# Patient Record
Sex: FEMALE | Race: WHITE | Employment: FULL TIME | URBAN - METROPOLITAN AREA
[De-identification: names, ages, dates, MRNs, and addresses within clinical notes are randomized per-mention and may not be internally consistent; named-entity substitution may affect disease eponyms.]

---

## 2019-05-07 NOTE — TELEPHONE ENCOUNTER
Phone Call:     Contact Name Patient   Outcome Patient said they will bring in test results of a family member. They also said that they have not been seen for this at any other facilities.

## 2019-07-12 ENCOUNTER — ONCOLOGY VISIT (OUTPATIENT)
Dept: ONCOLOGY | Facility: CLINIC | Age: 59
End: 2019-07-12

## 2019-07-12 ENCOUNTER — PRE VISIT (OUTPATIENT)
Dept: ONCOLOGY | Facility: CLINIC | Age: 59
End: 2019-07-12

## 2019-07-12 DIAGNOSIS — Z84.81 FAMILY HISTORY OF CARRIER OF GENETIC DISEASE: ICD-10-CM

## 2019-07-12 DIAGNOSIS — Z80.3 FAMILY HISTORY OF MALIGNANT NEOPLASM OF BREAST: Primary | ICD-10-CM

## 2019-07-12 PROCEDURE — 96040 ZZC GENETIC COUNSELING, EACH 30 MIN: CPT | Performed by: GENETIC COUNSELOR, MS

## 2019-07-12 PROCEDURE — 40001076 ZZHCL STATISTICAL OVANEXT: Mod: 90 | Performed by: INTERNAL MEDICINE

## 2019-07-12 PROCEDURE — 36415 COLL VENOUS BLD VENIPUNCTURE: CPT | Performed by: INTERNAL MEDICINE

## 2019-07-12 PROCEDURE — 99000 SPECIMEN HANDLING OFFICE-LAB: CPT | Performed by: INTERNAL MEDICINE

## 2019-07-12 NOTE — PROGRESS NOTES
7/16/2019    Referring Provider: Self-referred    Presenting Information:   I met with Hortencia Cintron today for genetic counseling at the Cancer Risk Management Program at the [facility] to discuss the known BRCA2 mutation in her family. Specifically, the familial BRCA2 mutation is c.2330dupA. This mutation was initially found in a paternal cousin. This cousin's sister also tested positive for this mutation. Hortencia provided us with this sister's targeted test results. She is here today to review this history, cancer screening recommendations, and available genetic testing options.    Personal History:  Hortencia is a 59 year old female.  She does not have any personal history of cancer. She had her first menstrual period at age 11, her first child at age 33, and is completed menopause at age 53. Hortencia has her ovaries, fallopian tubes and uterus in place, and she has had no ovarian cancer screening to date.  She reports that she has not used hormone replacement therapy. She has annual clinical breast exams and mammograms.  Hortencia has not had a colonoscopy. She does not regularly do any other cancer screening at this time.     Family History: (Please see scanned pedigree for detailed family history information)  A paternal cousin was diagnosed with breast cancer in her late 50's. She reportedly had genetic testing when she was diagnosed approximately 10 years ago and tested positive for a mutation in BRCA2. A copy of this test report was not available for review today.  This cousin's daughter was diagnosed with breast cancer in her early 40's. She reportedly tested positive for the familial BRCA2 mutation. A copy of this test report were not available for review today.    A paternal cousin, the sister of the first cousin, has not had cancer but tested positive for the familial BRCA2 mutation. A copy of her targeted test result was provided at today's visit which showed the mutation to be BRCA2 c.2330dupA.     Her  maternal ethnicity is [place]. Her paternal ethnicity is [place] and English.  There is no known Ashkenazi Synagogue ancestry on either side of her family. There is no reported consanguinity.    Discussion:    We discussed the natural history and genetics of BRCA2 related cancers. A detailed handout regarding breast and ovarian cancer, along with the information we discussed, was provided to Hortencia at the end of our appointment today and can be found in the after visit summary.  Topics included: inheritance pattern, cancer risks, cancer screening recommendations, and also risks, benefits and limitations of testing.    Based on Hortencia's cousin's genetic test result, Hortencia has up to a 12.5% chance of also carrying the same genetic change in the BRCA2 gene. No other paternal relatives had cancer or had genetic testing. There is a 50% chance that Hortencia's uncle, her cousins' father, carried the BRCA2 mutation. If he carried this mutation, there is a 50% chance Hortencia's father carried this mutation. If he carried the mutation, there is a 50% chance that Hortencia carries this mutation. Both her father and uncle have passed away and are not available for testing.     Based on this, Hortencia meets the National Comprehensive Cancer Network (NCCN) criteria for genetic testing of the familial BRCA2 mutation.      Genetic testing is available for: BRCA2 site-specific testing for the known familial mutation c.2330dupA or panel testing. We reviewed genetic testing options for hereditary breast and gynecologic cancers: actionable high/moderate breast and gynecologic cancer risk custom panel (CustomNext-Cancer, 19 genes, a combination of GynPlus and BRCAplus + NBN, STK11, and NF1) and expanded high and moderate risk panel (OvaNext, 25 genes). Hortencia expressed an interest in learning as much information as possible from the testing. She opted for the OvaNext panel.  Genetic testing is available for 25 genes associated with hereditary gynecologic,  breast, and related cancers: OvaNext (BELEN, BARD1, BRCA1, BRCA2, BRIP1, CDH1, CHEK2, DICER1, EPCAM, MLH1, MRE11A, MSH2, MSH6, MUTYH, NBN, NF1, PALB2, PMS2, PTEN, RAD50, RAD51C, RAD51D, SMARCA4, STK11, and TP53).  We discussed that some of the genes in the OvaNext panel are associated with specific hereditary cancer syndromes and published management guidelines: Hereditary Breast and Ovarian Cancer syndrome (BRCA1, BRCA2), Cheney syndrome (MLH1, MSH2, MSH6, PMS2, EPCAM), Hereditary Diffuse Gastric Cancer (CDH1), Cowden syndrome (PTEN), Li Fraumeni syndrome (TP53), Peutz-Jeghers syndrome (STK11), MUTYH Associated Polyposis (MUTYH), and Neurofibromatosis type 1 (NF1).    Risk-reducing salpingo-oophorectomy can be considered in women with mutations in BRIP1, RAD51C, or RAD51D. Breast and/or other cancer risk management guidelines are available for BELEN, CHEK2, PALB2, NF1, and NBN.  The remaining genes (BARD1, DICER1, MRE11A, RAD50, and SMARCA4) are associated with increased cancer risk and may allow us to make medical recommendations when mutations are identified.    Hortencia was provided with a detailed brochure from [employer] explaining the OvaNext testing.  Consent was obtained and genetic testing for OvaNext was sent to [facility]. Turn around time: approximately 5 weeks.    We discussed the implications of both positive and negative test result for Hortencia.     If testing comes back positive for the familial BRCA2 mutation, Hortencia will be recommended to follow the screening recommendations for women with BRCA2 mutations as published by the National Comprehensive Cancer Network (NCCN). Women with BRCA2 mutations qualify for high risk screening, which involves breast MRI in addition to mammogram. Double mastectomy may be considered based on family history. Risk-reducing salpingo-oophorectomy is recommended upon completion of child bearing. Further screening recommendations will be discussed if needed after  test results become available.    If testing comes back negative for this mutation, Hortencia would not be at an increased risk for BRCA2-associated cancers. We would discuss other cancer screening as necessary.    Medical Management: For Hortencia, we reviewed that the information from genetic testing may determine:    additional cancer screening for which Hortencia may qualify (i.e. mammogram and breast MRI, more frequent colonoscopies, more frequent dermatologic exams, etc.),    options for risk reducing surgeries Hortencia could consider (i.e. bilateral mastectomy, surgery to remove the ovaries and/or uterus, etc.),      and targeted chemotherapies for Hortencia if she were to develop certain cancers in the future (i.e. Immunotherapy for individuals with Cheney syndrome, PARP inhibitors, etc.).      These recommendations will be discussed in detail once genetic testing is completed.    Hortencia also elected to pursue [facility]'s self pay option of $399 and not go through her insurance for genetic testing. She was concerned about insurance coverage and other implications should she test positive.    We informed her about the Genetic Information Nondiscrimination Act (ORALIA) and how this law protects individuals from being discriminated against by health insurance and employment, based on their genetic testing results. We also discussed that, for example, if she were to test positive, her insurance would be billed for any screening or surgery decisions based on those results, as a BRCA2 positive diagnosis code would be used in this case. We also discussed that there is no way to guarantee that her health insurance would not learn that she had genetic testing. Despite this, she still elected to do the self pay option for testing and the genetic counseling appointment.     Plan:  1) Today Hortencia elected to proceed with OvaNext.  2) This information should be available in 4-5 weeks.  3) Hortencia will return to clinic to discuss the results    Face  to face time: 40 minutes    Wilbert Sanchez MS  Genetic Counseling Intern  Phone: [phone]  Fax: [phone]    Attestation: Patient seen, evaluated and discussed with the Genetic Counseling Intern. I have verified the content of the note, which accurately reflects my assessment of the patient and the plan of care. She was made aware that we cannot guarantee that her health insurance will not learn she had genetic testing. Reviewed ORALIA information and insurance concerns again with patient at end of visit. Wilbert Sanchez was present. Patient elected to proceed with testing. She also stated that she will be paying for the visit herself; she was informed that diagnosis codes for the visit will also be in her records.   Supervising Genetic Counselor  Dia Levy MS, [facility]  Licensed Genetic Counselor

## 2019-07-12 NOTE — LETTER
Cancer Risk Management  Program Locations    [facility]  [facility]  [facility]  [facility]  Mailing Address  Cancer Risk Management Program  [facility]  [address]  [address]  [place] [address]    New patient appointments  [phone]  July 16, 2019    Hortencia Cintron  [address] [place] [facility] [address]    Dear Hortencia,    It was a pleasure meeting with you at [facility] on 7/12/19.  Here is a copy of the progress note from your recent genetic counseling visit to the Cancer Risk Management Program.  If you have any additional questions, please feel free to call.    Referring Provider: Self-referred    Presenting Information:   I met with Hortencia Cintron today for genetic counseling at the Cancer Risk Management Program at the [facility] to discuss the known BRCA2 mutation in her family. Specifically, the familial BRCA2 mutation is c.2330dupA. This mutation was initially found in a paternal cousin. This cousin's sister also tested positive for this mutation. Hortencia provided us with this sister's targeted test results. She is here today to review this history, cancer screening recommendations, and available genetic testing options.    Personal History:  Hortencia is a 59 year old female.  She does not have any personal history of cancer. She had her first menstrual period at age 11, her first child at age 33, and is completed menopause at age 53. Hortencia has her ovaries, fallopian tubes and uterus in place, and she has had no ovarian cancer screening to date.  She reports that she has not used hormone replacement therapy. She has annual clinical breast exams and mammograms.  Hortencia has not had a colonoscopy. She does not regularly do any other cancer screening at this time.     Family History: (Please see scanned pedigree  for detailed family history information)  A paternal cousin was diagnosed with breast cancer in her late 50's. She reportedly had genetic testing when she was diagnosed approximately 10 years ago and tested positive for a mutation in BRCA2. A copy of this test report was not available for review today.  This cousin's daughter was diagnosed with breast cancer in her early 40's. She reportedly tested positive for the familial BRCA2 mutation. A copy of this test report were not available for review today.    A paternal cousin, the sister of the first cousin, has not had cancer but tested positive for the familial BRCA2 mutation. A copy of her targeted test result was provided at today's visit which showed the mutation to be BRCA2 c.2330dupA.     Her maternal ethnicity is [place] citizen. Her paternal ethnicity is [place] and English.  There is no known Ashkenazi Synagogue ancestry on either side of her family. There is no reported consanguinity.    Discussion:    We discussed the natural history and genetics of BRCA2 related cancers. A detailed handout regarding breast and ovarian cancer, along with the information we discussed, was provided to Hortencia at the end of our appointment today and can be found in the after visit summary.  Topics included: inheritance pattern, cancer risks, cancer screening recommendations, and also risks, benefits and limitations of testing.    Based on Hortencia's cousin's genetic test result, Hortencia has up to a 12.5% chance of also carrying the same genetic change in the BRCA2 gene. No other paternal relatives had cancer or had genetic testing. There is a 50% chance that Hortencia's uncle, her cousins' father, carried the BRCA2 mutation. If he carried this mutation, there is a 50% chance Hortencia's father carried this mutation. If he carried the mutation, there is a 50% chance that Hortencia carries this mutation. Both her father and uncle have passed away and are not available for testing.     Based on this, Hortencia  meets the National Comprehensive Cancer Network (NCCN) criteria for genetic testing of the familial BRCA2 mutation.      Genetic testing is available for: BRCA2 site-specific testing for the known familial mutation c. 2330dupA or panel testing. We reviewed genetic testing options for hereditary breast and gynecologic cancers: actionable high/moderate breast and gynecologic cancer risk custom panel (CustomNext-Cancer, 19 genes, a combination of GynPlus and BRCAplus + NBN, STK11, and NF1) and expanded high and moderate risk panel (OvaNext, 25 genes). Hortencia expressed an interest in learning as much information as possible from the testing. She opted for the OvaNext panel.  Genetic testing is available for 25 genes associated with hereditary gynecologic, breast, and related cancers: OvaNext (BELEN, BARD1, BRCA1, BRCA2, BRIP1, CDH1, CHEK2, DICER1, EPCAM, MLH1, MRE11A, MSH2, MSH6, MUTYH, NBN, NF1, PALB2, PMS2, PTEN, RAD50, RAD51C, RAD51D, SMARCA4, STK11, and TP53).  We discussed that some of the genes in the OvaNext panel are associated with specific hereditary cancer syndromes and published management guidelines: Hereditary Breast and Ovarian Cancer syndrome (BRCA1, BRCA2), Cheney syndrome (MLH1, MSH2, MSH6, PMS2, EPCAM), Hereditary Diffuse Gastric Cancer (CDH1), Cowden syndrome (PTEN), Li Fraumeni syndrome (TP53), Peutz-Jeghers syndrome (STK11), MUTYH Associated Polyposis (MUTYH), and Neurofibromatosis type 1 (NF1).    Risk-reducing salpingo-oophorectomy can be considered in women with mutations in BRIP1, RAD51C, or RAD51D. Breast and/or other cancer risk management guidelines are available for BELEN, CHEK2, PALB2, NF1, and NBN.  The remaining genes (BARD1, DICER1, MRE11A, RAD50, and SMARCA4) are associated with increased cancer risk and may allow us to make medical recommendations when mutations are identified.    Hortencia was provided with a detailed brochure from [employer] explaining the OvaNext testing.  Consent was  obtained and genetic testing for OvaNext was sent to [facility]. Turn around time: approximately 5 weeks.    We discussed the implications of both positive and negative test result for Hortencia.     If testing comes back positive for the familial BRCA2 mutation, Hortencia will be recommended to follow the screening recommendations for women with BRCA2 mutations as published by the National Comprehensive Cancer Network (NCCN). Women with BRCA2 mutations qualify for high risk screening, which involves breast MRI in addition to mammogram. Double mastectomy may be considered based on family history. Risk-reducing salpingo-oophorectomy is recommended upon completion of child bearing. Further screening recommendations will be discussed if needed after test results become available.    If testing comes back negative for this mutation, Hortencia would not be at an increased risk for BRCA2-associated cancers. We would discuss other cancer screening as necessary.    Medical Management: For Hortencia, we reviewed that the information from genetic testing may determine:    additional cancer screening for which Hortencia may qualify (i.e. mammogram and breast MRI, more frequent colonoscopies, more frequent dermatologic exams, etc.),    options for risk reducing surgeries Hortencia could consider (i.e. bilateral mastectomy, surgery to remove the ovaries and/or uterus, etc.),      and targeted chemotherapies for Hortencia if she were to develop certain cancers in the future (i.e. Immunotherapy for individuals with Cheney syndrome, PARP inhibitors, etc.).      These recommendations will be discussed in detail once genetic testing is completed.    Hortencia also elected to pursue [facility]'s self pay option of $399 and not go through her insurance for genetic testing. She was concerned about insurance coverage and other implications should she test positive.    We informed her about the Genetic Information Nondiscrimination Act (ORALIA) and how this law  protects individuals from being discriminated against by health insurance and employment, based on their genetic testing results. We also discussed that, for example, if she were to test positive, her insurance would be billed for any screening or surgery decisions based on those results, as a BRCA2 positive diagnosis code would be used in this case. We also discussed that there is no way to guarantee that her health insurance would not learn that she had genetic testing. Despite this, she still elected to do the self pay option for testing and the genetic counseling appointment.     Plan:  1) Today Hortencia elected to proceed with OvaNext.  2) This information should be available in 4-5 weeks.  3) Hortencia will return to clinic to discuss the results    Face to face time: 40 minutes    Wilbert Sanchez MS  Genetic Counseling Intern  Phone: [phone]  Fax: [phone]    Attestation: Patient seen, evaluated and discussed with the Genetic Counseling Intern. I have verified the content of the note, which accurately reflects my assessment of the patient and the plan of care. She was made aware that we cannot guarantee that her health insurance will not learn she had genetic testing. Reviewed ORALIA information and insurance concerns again with patient at end of visit. Wilbert Sanchez was present. Patient elected to proceed with testing. She also stated that she will be paying for the visit herself; she was informed that diagnosis codes for the visit will also be in her records.   Supervising Genetic Counselor  Dia Levy MS, [facility]  Licensed Genetic Counselor

## 2019-07-12 NOTE — PATIENT INSTRUCTIONS
Assessing Cancer Risk  Only about 5-10% of cancers are thought to be due to an inherited cancer susceptibility gene.    These families often have:    Several people with the same or related types of cancer    Cancers diagnosed at a young age (before age 50)    Individuals with more than one primary cancer    Multiple generations of the family affected with cancer    Some people may be candidates for genetic testing of more than one gene.  For these families, genetic testing using a cancer panel may be offered.  These panels will test different genes known to increase the risk for breast, ovarian, uterine, and/or other cancers. All of the genes discussed below have published clinical management guidelines for individuals who are found to carry a mutation. The purpose of this handout is to serve as a brief summary of the genes analyzed by the panels used to inquire about hereditary breast and gynecologic cancer:  BELEN, BRCA1, BRCA2, BRIP1, CDH1, CHEK2, MLH1, MSH2, MSH6, PMS2, EPCAM, PTEN, PALB2, RAD51C, RAD51D, and TP53.  ______________________________________________________________________________  Hereditary Breast and Ovarian Cancer Syndrome   (BRCA1 and BRCA2)  A single mutation in one of the copies of BRCA1 or BRCA2 increases the risk for breast and ovarian cancer, among others.  The risk for pancreatic cancer and melanoma may also be slightly increased in some families.  The chart below shows the chance that someone with a BRCA mutation would develop cancer in his or her lifetime1,2,3,4.        A person s ethnic background is also important to consider, as individuals of Ashkenazi Sabianism ancestry have a higher chance of having a BRCA gene mutation.  There are three BRCA mutations that occur more frequently in this population.    Cheney Syndrome   (MLH1, MSH2, MSH6, PMS2, and EPCAM)  Currently five genes are known to cause Cheney Syndrome: MLH1, MSH2, MSH6, PMS2, and EPCAM.  A single mutation in one of the  Cheney Syndrome genes increases the risk for colon, endometrial, ovarian, and stomach cancers.  Other cancers that occur less commonly in Cheney Syndrome include urinary tract, skin, and brain cancers.  The chart below shows the chance that a person with Cheney syndrome would develop cancer in his or her lifetime5.      *Cancer risk varies depending on Cheney syndrome gene found    Cowden Syndrome   (PTEN)  Cowden syndrome is a hereditary condition that increases the risk for breast, thyroid, endometrial, colon, and kidney cancer.  Cowden syndrome is caused by a mutation in the PTEN gene.  A single mutation in one of the copies of PTEN causes Cowden syndrome and increases cancer risk.  The chart below shows the chance that someone with a PTEN mutation would develop cancer in their lifetime6,7.  Other benign features seen in some individuals with Cowden syndrome include benign skin lesions (facial papules, keratoses, lipomas), learning disability, autism, thyroid nodules, colon polyps, and larger head size.      *One recent study found breast cancer risk to be increased to 85%    Li-Fraumeni Syndrome   (TP53)  Li-Fraumeni Syndrome (LFS) is a cancer predisposition syndrome caused by a mutation in the TP53 gene. A single mutation in one of the copies of TP53 increases the risk for multiple cancers. Individuals with LFS are at an increased risk for developing cancer at a young age. The lifetime risk for development of a LFS-associated cancer is 50% by age 30 and 90% by age 60.   Core Cancers: Sarcomas, Breast, Brain, Lung, Leukemias/Lymphomas, Adrenocortical carcinomas  Other Cancers: Gastrointestinal, Thyroid, Skin, Genitourinary    Hereditary Diffuse Gastric Cancer   (CDH1)  Currently, one gene is known to cause hereditary diffuse gastric cancer (HDGC): CDH1.  Individuals with HDGC are at increased risk for diffuse gastric cancer and lobular breast cancer. Of people diagnosed with HDGC, 30-50% have a mutation in the CDH1  gene.  This suggests there are likely other genes that may cause HDGC that have not been identified yet.      Lifetime Cancer Risks    General Population HDGC    Diffuse Gastric  <1% ~80%   Breast 12% 39-52%         Additional Genes  BELEN  BELEN is a moderate-risk breast cancer gene. Women who have a mutation in BELEN can have between a 2-4 fold increased risk for breast cancer compared to the general population8. BELEN mutations have also been associated with increased risk for pancreatic cancer, however an estimate of this cancer risk is not well understood9. Individuals who inherit two BELEN mutations have a condition called ataxia-telangiectasia (AT).  This rare autosomal recessive condition affects the nervous system and immune system, and is associated with progressive cerebellar ataxia beginning in childhood.  Individuals with ataxia-telangiectasia often have a weakened immune system and have an increased risk for childhood cancers.    PALB2  Mutations in PALB2 have been shown to increase the risk of breast cancer up to 33-58% in some families; where individuals fall within this risk range is dependent upon family dsesbid96. PALB2 mutations have also been associated with increased risk for pancreatic cancer, although this risk has not been quantified yet.  Individuals who inherit two PALB2 mutations--one from their mother and one from their father--have a condition called Fanconi Anemia.  This rare autosomal recessive condition is associated with short stature, developmental delay, bone marrow failure, and increased risk for childhood cancers.    CHEK2   CHEK2 is a moderate-risk breast cancer gene.  Women who have a mutation in CHEK2 have around a 2-fold increased risk for breast cancer compared to the general population, and this risk may be higher depending upon family history.11,12,13 Mutations in CHEK2 have also been shown to increase the risk of a number of other cancers, including colon and prostate, however  these cancer risks are currently not well understood.    BRIP1, RAD51C and RAD51D  Mutations in BRIP1, RAD51C, and RAD51D have been shown to increase the risk of ovarian cancer and possibly female breast cancer as well14,15 .       Lifetime Cancer Risk    General Population BRIP1 RAD51C RAD51D   Ovarian 1-2% ~5-8% ~5-9% ~7-15%           Inheritance  All of the cancer syndromes reviewed above are inherited in an autosomal dominant pattern.  This means that if a parent has a mutation, each of his or her children will have a 50% chance of inheriting that same mutation.  Therefore, each child--male or female--would have a 50% chance of being at increased risk for developing cancer.      Image obtained from Genetics Home Reference, 2013     Mutations in some genes can occur de ray, which means that a person s mutation occurred for the first time in them and was not inherited from a parent.  Now that they have the mutation, however, it can be passed on to future generations.    Genetic Testing  Genetic testing involves a blood test and will look at the genetic information in the BELEN, BRCA1, BRCA2, BRIP1, CDH1, CHEK2, MLH1, MSH2, MSH6, PMS2, EPCAM, PTEN, PALB2, RAD51C, RAD51D, and TP53 genes for any harmful mutations that are associated with increased cancer risk.  If possible, it is recommended that the person(s) who has had cancer be tested before other family members.  That person will give us the most useful information about whether or not a specific gene is associated with the cancer in the family.    Results  There are three possible results of genetic testing:    Positive--a harmful mutation was identified in one or more of the genes    Negative--no mutation was identified in any of the genes on this panel    Variant of unknown significance--a variation in one of the genes was identified, but it is unclear how this impacts cancer risk in the family    Advantages and Disadvantages   There are advantages and  disadvantages to genetic testing.    Advantages    May clarify your cancer risk    Can help you make medical decisions    May explain the cancers in your family    May give useful information to your family members (if you share your results)    Disadvantages    Possible negative emotional impact of learning about inherited cancer risk    Uncertainty in interpreting a negative test result in some situations    Possible genetic discrimination concerns (see below)    Genetic Information Nondiscrimination Act (ORALIA)  ORALIA is a federal law that protects individuals from health insurance or employment discrimination based on a genetic test result alone.  Although rare, there are currently no legal discrimination protections in terms of life insurance, long term care, or disability insurances.  Visit the [employer] [place] website to learn more.    Reducing Cancer Risk  All of the genes described above have nationally recognized cancer screening guidelines that would be recommended for individuals who test positive.  In addition to increased cancer screening, surgeries may be offered or recommended to reduce cancer risk.  Recommendations are based upon an individual s genetic test result as well as their personal and family history of cancer.    Questions to Think About Regarding Genetic Testing:    What effect will the test result have on me and my relationship with my family members if I have an inherited gene mutation?  If I don t have a gene mutation?    Should I share my test results, and how will my family react to this news, which may also affect them?    Are my children ready to learn new information that may one day affect their own health?    Hereditary Cancer Resources    FORCE: Facing Our Risk of Cancer Empowered facingourrisk.org   Bright Pink bebrightpink.org   Li-Fraumeni Syndrome Association lfsassociation.org   PTEN World PTENworld.com   No stomach for cancer, Inc.  nostomachforcancer.org   Stomach cancer relief network Scrnet.org   Collaborative Group of the Americas on Inherited Colorectal Cancer (CGA) cgaicc.com    Cancer Care cancercare.org   American Cancer Society (ACS) cancer.org   National Cancer [place] (NCI) cancer.gov     Please call us if you have any questions or concerns.   Cancer Risk Management Program [phone]-CANCER ([phone])  ? Dia Levy, MS, [facility]  [phone]  ? Wilbert Sanchez, MS  [phone]  ? Kayla Lunsford, MS, [facility]  [phone]  ? Bonnie Macedo, MS, [facility]  [phone]  ? Janay Sheffield, MS, [facility]  [phone]  ? Monica Qureshi, MS, [facility] [phone]  ? Raissa Fine, MS, [facility] [phone]    References  1. Chidi A, Charlie PDP, Richmond S, Dano GARCIA, Alina JE, Remberto JL, oJrge N, Allison H, Jerome O, Otilia A, Renzoini B, Radiamol P, Manoukirhona S, Srikanth DM, Isaiah N, Tank E, Rakan H, Angel E, Robert J, Grongina J, Eros B, Marita H, Thorlacius S, Eerola H, Nevrhonalinna H, Mariano K, Alena OP. Average risks of breast and ovarian cancer associated with BRCA1 or BRCA2 mutations detected in case series unselected for family history: a combined analysis of 222 studies. Am J Hum Estefania. 2003;72:1117-30.  2. Allen N, Carmen M, Piotr G.  BRCA1 and BRCA2 Hereditary Breast and Ovarian Cancer. Gene Reviews online. 2013.  3. Brayden YC, Ronna S, Rosanna G, Betancourt S. Breast cancer risk among male BRCA1 and BRCA2 mutation carriers. J Natl Cancer Inst. 2007;99:1811-4.  4. Andrew DG, Mony I, Roel J, Claribel E, Al ER, Kaleb F. Risk of breast cancer in male BRCA2 carriers. J Med Estefania. 2010;47:710-1.  5. National Comprehensive Cancer Network. Clinical practice guidelines in oncology, colorectal cancer screening. Available online (registration required). 2015.  6. Vipul GRESHAM, Ruddy J, Matthew J, Aleksandra LA, Bro MS, Arnie C. Lifetime cancer risks in individuals with germline PTEN mutations. Clin Cancer Res. 2012;18:400-7.  7. Pilarski R. Cowden  Syndrome: A Critical Review of the Clinical Literature. J Estefania . 2009:18:13-27.  8. Williams A, Sandeep D, Tara S, Melissa P, Hazel T, Stephen M, Arsalan B, Howie H, Tahira R, Zeus K, Manuel L, Andrew DG, Srikanth D, Colby DF, Mendy MR, The Breast Cancer Susceptibility Collaboration (UK) & Girma WOODWARD. BELEN mutations that cause ataxia-telangiectasia are breast cancer susceptibility alleles. Nature Genetics. 2006;38:873-875  9. Nilson N , Farhad Y, Natalie J, Preeti L, Miguel Ángel GM , Jonas ML, Gallinger S, Dobbins AG, Syngal S, Misty ML, Lisa J , Talya R, Wilner SZ, Annette JR, Dionne VE, Xiomara M, Vodonna B, Elena N, Gail RH, Paul KW, and Jm AP. BELEN mutations in patients with hereditary pancreatic cancer. Cancer Discover. 2012;2:41-46  10. Chidi CEDENO, et al. Breast-Cancer Risk in Families with Mutations in PALB2. NEJM. 2014; 371(6):497-506.  11. CHEK2 Breast Cancer Case-Control Consortium. CHEK2*1100delC and susceptibility to breast cancer: A collaborative analysis involving 10,860 breast cancer cases and 9,065 controls from 10 studies. Am J Hum Estefania, 74 (2004), pp. 1534-0788  12. Lon T, Chandler S, Almas K, et al. Spectrum of Mutations in BRCA1, BRCA2, CHEK2, and TP53 in Families at High Risk of Breast Cancer. JESUS. 2006;295(12):0345-1992.   13. Alison C, Bradford D, Ziggy A, et al. Risk of breast cancer in women with a CHEK2 mutation with and without a family history of breast cancer. J Clin Oncol. 2011;29:7237-5404.  14. Justin H, Hill E, Luis Alfredo SJ, et al. Contribution of germline mutations in the RAD51B, RAD51C, and RAD51D genes to ovarian cancer in the population. J Clin Oncol. 2015;33(26):8609-7613. Doi:10.1200/JCO.2015.61.2408.  15. Kristian T, Christiana PERDUE, Ana P, et al. Mutations in BRIP1 confer high risk of ovarian cancer. Melissa Estefania. 2011;43(11):4420-5093. doi:10.1038/ng.955.

## 2019-08-05 LAB — LAB SCANNED RESULT: NORMAL

## 2019-08-27 ENCOUNTER — VIRTUAL VISIT (OUTPATIENT)
Dept: ONCOLOGY | Facility: CLINIC | Age: 59
End: 2019-08-27

## 2019-08-27 DIAGNOSIS — Z84.81 FAMILY HISTORY OF CARRIER OF GENETIC DISEASE: ICD-10-CM

## 2019-08-27 DIAGNOSIS — Z80.3 FAMILY HISTORY OF MALIGNANT NEOPLASM OF BREAST: Primary | ICD-10-CM

## 2019-08-27 PROCEDURE — 99207 ZZC NO CHARGE LOS: CPT | Performed by: GENETIC COUNSELOR, MS

## 2019-08-27 NOTE — Clinical Note
Please print and send a copy of this letter to the patient.    Please enclose test report: [facility] [[id number]] (Order [id number])  dated 7/12/19 - print the scanned document and include with letter.

## 2019-08-27 NOTE — LETTER
Cancer Risk Management  Program [facility]  [facility]  [facility]  [facility]  Mailing Address  Cancer Risk Management Program  [facility]  [address]  [place] [address]    New patient appointments  [phone]  August 28, 2019    Hortencia Cintron  [address] [facility] [address]      Dear Hortencia,     It was a pleasure speaking with you on the phone on 8/27/2019.  Here is a copy of the progress note from our discussion.  If you have any additional questions, please feel free to call.    Referring Provider: Self-referred    Presenting Information:  I spoke to Hortencia by phone today to discuss her genetic testing results. Her blood was drawn on 7/12/2019. Her test was ordered  from [facility]. This testing was done because of Hortencia's family history of breast cancer and known familial mutation in BRCA2.    Genetic Testing Result: NEGATIVE  Hortencia's test results were negative. The mutation that was identified in her paternal cousin was in the BRCA2 gene. Specifically this mutation is called [id number]. Hortencia does not have the mutation previously identified in her paternal cousin.    Hortencia is also negative for mutations in the BELEN, BARD1, BRCA1, BRCA2, BRIP1, CDH1, CHEK2, DICER1, EPCAM, MLH1, MRE11A, MSH2, MSH6, MUTYH, NBN, NF1, PALB2, PMS2, PTEN, RAD50, RAD51C, RAD51D, SMARCA4, STK11, and TP53 genes. No mutations were found in any of the 25 genes analyzed. This test involved sequencing and deletion/duplication analysis of all genes with the exceptions of EPCAM (deletions/duplications only).     Testing did not detect an identifiable mutation associated with Hereditary Breast and Ovarian Cancer syndrome (BRCA1, BRCA2), Cheney syndrome (MLH1, MSH2, MSH6, PMS2, EPCAM), Hereditary Diffuse Gastric Cancer (CDH1), Cowden  syndrome (PTEN), Li Fraumeni syndrome (TP53), Peutz-Jeghers syndrome (STK11), MUTYH Associated Polyposis (MUTYH), or Neurofibromatosis type 1 (NF1).     Interpretation:   Based on this test result, Hortencia does not have Hereditary Breast and Ovarian Cancer syndrome and is not at an increased risk for the associated cancers. Even though she does not have the familial BRCA2 mutation, she is still at general population lifetime risk for the associated cancers.    Screening:  Based on this negative test result, it is important for Hortencia and her relatives to refer back to the family history for appropriate cancer screening.      Due to the close family history of melanoma, Hortencia remains at some increased risk for developing melanoma. According to the American Cancer Society, Hortencia is encouraged to speak to her primary care provider about having regular skin exams and safe skin practices (i.e. sunscreen, self skin exams, limited sun exposure, etc.).    Other population cancer screening options, such as those recommended by the American Cancer Society and the National Comprehensive Cancer Network (NCCN), are also appropriate for Hortencia and her family. These screening recommendations may change if there are changes to Hortencai's personal and/or family history of cancer. Final screening recommendations should be made by each individual's managing physician.      Inheritance:  We reviewed the autosomal dominant inheritance of these genes.  We discussed that Hortencia did not pass on an identifiable mutation in these genes to her children based on this test result.  Mutations in these genes do not skip generations.      Additional Testing Considerations:   Although Hortencia's genetic testing result was negative, other relatives may still carry the familial BRCA2 mutation and/or a different gene mutation associated with breast cancer. Genetic counseling is recommended for close paternal relatives to discuss genetic testing options. If any  of these relatives do pursue genetic testing, Hortencia is encouraged to contact me so that we may review the impact of their test results on her.    Plan:  1. A copy of the test results will be mailed to Hortencia.  2. She plans to follow-up with her physicians.  3. Hortencia is encouraged to contact me annually and/or with any changes to her personal/familiy history, as this information may inform future cancer screening or genetic testing recommendations.    If Hortencia has any further questions, I encouraged her to contact me at [phone].    Wilbert Sanchez MS  Genetic Counseling Intern  Phone: [phone]  Fax: [phone]    Attestation: Patient results and plan evaluated and discussed with the Genetic Counseling Intern. I have verified the content of the note, which accurately reflects my assessment of the patient and the plan of care.  Supervising Genetic Counselor  Dia Levy MS, [facility]  Licensed Genetic Counselor

## 2019-08-27 NOTE — PROGRESS NOTES
"8/27/2019    Referring Provider: Self-referred    Presenting Information:  I spoke to Hortencia by phone today to discuss her genetic testing results. Her blood was drawn on 7/12/2019. Her test was ordered  from [facility]. This testing was done because of Hortencia's family history of breast cancer and known familial mutation in BRCA2.    Genetic Testing Result: NEGATIVE  Hortencia's test results were negative. The mutation that was identified in her paternal cousin was in the BRCA2 gene. Specifically this mutation is called [id number]. Hortencia does not have the mutation previously identified in her paternal cousin.    Hortencia is also negative for mutations in the BELEN, BARD1, BRCA1, BRCA2, BRIP1, CDH1, CHEK2, DICER1, EPCAM, MLH1, MRE11A, MSH2, MSH6, MUTYH, NBN, NF1, PALB2, PMS2, PTEN, RAD50, RAD51C, RAD51D, SMARCA4, STK11, and TP53 genes. No mutations were found in any of the 25 genes analyzed. This test involved sequencing and deletion/duplication analysis of all genes with the exceptions of EPCAM (deletions/duplications only).     Testing did not detect an identifiable mutation associated with Hereditary Breast and Ovarian Cancer syndrome (BRCA1, BRCA2), Cheney syndrome (MLH1, MSH2, MSH6, PMS2, EPCAM), Hereditary Diffuse Gastric Cancer (CDH1), Cowden syndrome (PTEN), Li Fraumeni syndrome (TP53), Peutz-Jeghers syndrome (STK11), MUTYH Associated Polyposis (MUTYH), or Neurofibromatosis type 1 (NF1).      A copy of the test report can be found in the Laboratory tab, dated 7/12/2019, and named \"[facility]ANEXT\". The report is scanned in as a linked document.    Interpretation:   Based on this test result, Hortencia does not have Hereditary Breast and Ovarian Cancer syndrome and is not at an increased risk for the associated cancers. Even though she does not have the familial BRCA2 mutation, she is still at general population lifetime risk for the associated cancers.    Screening:  Based on this negative test result, it is important for Hortencia " and her relatives to refer back to the family history for appropriate cancer screening.      Due to the close family history of melanoma, Hortencia remains at some increased risk for developing melanoma. According to the American Cancer Society, Hortencia is encouraged to speak to her primary care provider about having regular skin exams and safe skin practices (i.e. sunscreen, self skin exams, limited sun exposure, etc.).    Other population cancer screening options, such as those recommended by the American Cancer Society and the National Comprehensive Cancer Network (NCCN), are also appropriate for Hortencia and her family. These screening recommendations may change if there are changes to Hortencia's personal and/or family history of cancer. Final screening recommendations should be made by each individual's managing physician.      Inheritance:  We reviewed the autosomal dominant inheritance of these genes.  We discussed that Hortencia did not pass on an identifiable mutation in these genes to her children based on this test result.  Mutations in these genes do not skip generations.      Additional Testing Considerations:   Although Hortencia's genetic testing result was negative, other relatives may still carry the familial BRCA2 mutation and/or a different gene mutation associated with breast cancer. Genetic counseling is recommended for close paternal relatives to discuss genetic testing options. If any of these relatives do pursue genetic testing, Hortencia is encouraged to contact me so that we may review the impact of their test results on her.    Plan:  1. A copy of the test results will be mailed to Hortencia.  2. She plans to follow-up with her physicians.  3. Hortencia is encouraged to contact me annually and/or with any changes to her personal/familiy history, as this information may inform future cancer screening or genetic testing recommendations.    If Hortencia has any further questions, I encouraged her to contact me at  [phone].    Wilbert Sanchez MS  Genetic Counseling Intern  Phone: [phone]  Fax: [phone]    Attestation: Patient results and plan evaluated and discussed with the Genetic Counseling Intern. I have verified the content of the note, which accurately reflects my assessment of the patient and the plan of care.  Supervising Genetic Counselor  Dia Levy MS, [facility]  Licensed Genetic Counselor